# Patient Record
Sex: MALE | Race: BLACK OR AFRICAN AMERICAN | Employment: FULL TIME | URBAN - METROPOLITAN AREA
[De-identification: names, ages, dates, MRNs, and addresses within clinical notes are randomized per-mention and may not be internally consistent; named-entity substitution may affect disease eponyms.]

---

## 2017-05-01 ENCOUNTER — HOSPITAL ENCOUNTER (EMERGENCY)
Age: 42
Discharge: HOME | End: 2017-05-01
Attending: EMERGENCY MEDICINE
Payer: COMMERCIAL

## 2017-05-01 VITALS
TEMPERATURE: 98.1 F | HEIGHT: 64 IN | DIASTOLIC BLOOD PRESSURE: 107 MMHG | OXYGEN SATURATION: 98 % | WEIGHT: 260 LBS | RESPIRATION RATE: 18 BRPM | HEART RATE: 94 BPM | BODY MASS INDEX: 44.39 KG/M2 | SYSTOLIC BLOOD PRESSURE: 172 MMHG

## 2017-05-01 DIAGNOSIS — M77.8 TENDINITIS OF HAND: Primary | ICD-10-CM

## 2017-05-01 DIAGNOSIS — M79.641 HAND PAIN, RIGHT: ICD-10-CM

## 2017-05-01 DIAGNOSIS — Z87.39 PERSONAL HISTORY OF GOUT: ICD-10-CM

## 2017-05-01 PROCEDURE — 99282 EMERGENCY DEPT VISIT SF MDM: CPT

## 2017-05-01 RX ORDER — METHYLPREDNISOLONE 4 MG/1
TABLET ORAL
Qty: 1 DOSE PACK | Refills: 0 | Status: SHIPPED | OUTPATIENT
Start: 2017-05-01

## 2017-05-01 RX ORDER — HYDROCODONE BITARTRATE AND ACETAMINOPHEN 5; 325 MG/1; MG/1
1 TABLET ORAL
Qty: 20 TAB | Refills: 0 | Status: SHIPPED | OUTPATIENT
Start: 2017-05-01

## 2017-05-01 NOTE — DISCHARGE INSTRUCTIONS
Hand Pain: Care Instructions  Your Care Instructions  Common causes of hand pain are overuse and injuries, such as might happen during sports or home repair projects. Everyday wear and tear, especially as you get older, also can cause hand pain. Most minor hand injuries will heal on their own, and home treatment is usually all you need to do. If you have sudden and severe pain, you may need tests and treatment. Follow-up care is a key part of your treatment and safety. Be sure to make and go to all appointments, and call your doctor if you are having problems. Its also a good idea to know your test results and keep a list of the medicines you take. How can you care for yourself at home? · Take pain medicines exactly as directed. ¨ If the doctor gave you a prescription medicine for pain, take it as prescribed. ¨ If you are not taking a prescription pain medicine, ask your doctor if you can take an over-the-counter medicine. · Rest and protect your hand. Take a break from any activity that may cause pain. · Put ice or a cold pack on your hand for 10 to 20 minutes at a time. Put a thin cloth between the ice and your skin. · Prop up the sore hand on a pillow when you ice it or anytime you sit or lie down during the next 3 days. Try to keep it above the level of your heart. This will help reduce swelling. · If your doctor recommends a sling, splint, or elastic bandage to support your hand, wear it as directed. When should you call for help? Call 911 anytime you think you may need emergency care. For example, call if:  · Your hand turns cool or pale or changes color. Call your doctor now or seek immediate medical care if:  · You cannot move your hand. · Your hand pops, moves out of its normal position, and then returns to its normal position. · You have signs of infection, such as:  ¨ Increased pain, swelling, warmth, or redness. ¨ Red streaks leading from the sore area.   ¨ Pus draining from a place on your hand. ¨ A fever. · Your hand feels numb or tingly. Watch closely for changes in your health, and be sure to contact your doctor if:  · Your hand feels unstable when you try to use it. · You do not get better as expected. · You have any new symptoms, such as swelling. · Bruises from an injury to your hand last longer than 2 weeks. Where can you learn more? Go to http://rey-jennifer.info/. Enter R273 in the search box to learn more about \"Hand Pain: Care Instructions. \"  Current as of: May 27, 2016  Content Version: 11.2  © 0432-4320 [employer]. Care instructions adapted under license by [employer] (which disclaims liability or warranty for this information). If you have questions about a medical condition or this instruction, always ask your healthcare professional. [address] any warranty or liability for your use of this information.

## 2017-05-01 NOTE — ED TRIAGE NOTES
Pt states \" I am having hand pain for a month. I feel like sometimes it locks up. This morning i couldn't even go to work. \"

## 2017-05-01 NOTE — ED PROVIDER NOTES
[address] [address]  EMERGENCY DEPARTMENT HISTORY AND PHYSICAL EXAM       Date: 5/1/2017   Patient Name: Bita Gomez   YOB: 1975  Medical Record Number: [id number]    History of Presenting Illness     Chief Complaint   Patient presents with    Hand Pain       History Provided By:  patient    Additional History: 7:35 PM   Bita Gomez is a 36 y.o. male who presents to the emergency department with hx of gout (right ankle and toes) C/O right hand pain starting 2 weeks ago and worsening today. The pain is mostly in the center of the palm, and in the 4th finger. Sxs are worse in the morning. He has been seen by his PCP for his sxs and was instructed to take Aleve in the morning for his sxs. Pt reports that this has provided some relief for his pain, but was ineffective this morning. Pt is right-handed and works at the [employer] as a . Pt reports hx of tendonitis in the left hand 3 years ago and was prescribed physical therapy. Pt takes Allopurinol daily for gout. Denies numbness and any other sxs or complaints. Primary Care Provider: Albaro Pugh MD   Specialist:    Past History     Past Medical History:   Past Medical History:   Diagnosis Date    Hypertension         Past Surgical History:   Past Surgical History:   Procedure Laterality Date    ABDOMEN SURGERY PROC UNLISTED      hernia        Family History:   History reviewed. No pertinent family history. Social History:   Social History   Substance Use Topics    Smoking status: Never Smoker    Smokeless tobacco: None    Alcohol use Yes      Comment: socially        Allergies:   No Known Allergies     Review of Systems   Review of Systems   Musculoskeletal:        Right hand pain   Neurological: Negative for numbness. All other systems reviewed and are negative.       Physical Exam  Vitals:    05/01/17 1926   BP: (!) 172/107   Pulse: 94   Resp: 18   Temp: 98.1 °F (36.7 °C)   SpO2: 98%   Weight: 117.9 kg (260 lb)   Height: 5' 4\" (1.626 m)       Physical Exam   Constitutional: He is oriented to person, place, and time. Vital signs are normal. He appears well-developed and well-nourished. No distress. HENT:   Head: Normocephalic and atraumatic. Eyes: Conjunctivae are normal.   Neck: Normal range of motion. Neck supple. Cardiovascular: Normal rate, regular rhythm, normal heart sounds and intact distal pulses. Pulmonary/Chest: Effort normal and breath sounds normal. No respiratory distress. Musculoskeletal: Normal range of motion. He exhibits tenderness. He exhibits no edema or deformity. Right hand: He exhibits tenderness (+ttp). He exhibits normal range of motion (full ROM although pain increased when making fist/extending palm/fingers), no bony tenderness, normal capillary refill, no deformity, no laceration and no swelling. Normal sensation noted. Decreased sensation is not present in the ulnar distribution, is not present in the medial distribution and is not present in the radial distribution. Normal strength noted. He exhibits no finger abduction, no thumb/finger opposition and no wrist extension trouble. Hands:  Neurological: He is alert and oriented to person, place, and time. Skin: Skin is warm. He is not diaphoretic. Psychiatric: He has a normal mood and affect. Nursing note and vitals reviewed. Diagnostic Study Results     Labs -    No results found for this or any previous visit (from the past 12 hour(s)). Radiologic Studies -  The following have been ordered and reviewed:  No orders to display       Medical Decision Making   I am the first provider for this patient. I reviewed the vital signs, available nursing notes, past medical history, past surgical history, family history and social history. Vital Signs-Reviewed the patient's vital signs.    Patient Vitals for the past 12 hrs:   Temp Pulse Resp BP SpO2   05/01/17 1926 98.1 °F (36.7 °C) 94 18 (!) 172/107 98 %       Pulse Oximetry Analysis - Normal 98% on room air     Procedures:   Procedures    ED Course:  7:35 PM  Initial assessment performed. The patients presenting problems have been discussed, and they are in agreement with the care plan formulated and outlined with them. I have encouraged them to ask questions as they arise throughout their visit. Medications Given in the ED:  Medications - No data to display    Discharge Note:  7:42 PM   Pt has been reexamined. Patient has no new complaints, changes, or physical findings. Care plan outlined and precautions discussed. Results were reviewed with the patient. All medications were reviewed with the patient; will d/c home with Medrol Nahum and Norco. All of pt's questions and concerns were addressed. Patient was instructed and agrees to follow up with his PCP and Ortho, as well as to return to the ED upon further deterioration. Patient is ready to go home. Diagnosis   Clinical Impression:   1. Tendinitis of hand    2. Hand pain, right    3.  Personal history of gout         Follow-up Information     Follow up With Details Comments Contact Info    Ion Marks MD Schedule an appointment as soon as possible for a visit in 2 days For primary care follow up [address] [facility] and Internal MED   [facility] [phone]      Pedro Thorne MD Schedule an appointment as soon as possible for a visit in 2 days For orthopedic follow up [address]      THE [facility] EMERGENCY DEPT  As needed, If symptoms worsen [address] [facility]  [phone]          Discharge Medication List as of 5/1/2017  7:50 PM      START taking these medications    Details   methylPREDNISolone (MEDROL, NAHUM,) 4 mg tablet Take as directed, Print, Disp-1 Dose Pack, R-0      !! HYDROcodone-acetaminophen (NORCO) 5-325 mg per tablet Take 1 Tab by mouth every four (4) hours as needed for Pain. Max Daily Amount: 6 Tabs., Print, Disp-20 Tab, R-0       !! - Potential duplicate medications found. Please discuss with provider. CONTINUE these medications which have NOT CHANGED    Details   allopurinol (ZYLOPRIM) 300 mg tablet Take 300 mg by mouth daily. Indications: GOUT, Historical Med      amLODIPine (NORVASC) 10 mg tablet Take  by mouth daily. Indications: HYPERTENSION, Historical Med      !! HYDROcodone-acetaminophen (NORCO) 5-325 mg per tablet Take 1 tablet by mouth every four (4) hours as needed for Pain., Print, Disp-20 tablet, R-0       !! - Potential duplicate medications found. Please discuss with provider.          _______________________________   Attestations: This note is prepared by Arjun Geronimo, acting as a Scribe for Mallika Chaudhry PA-C on 7:34 PM on 5/1/2017 . Mallika Chaudhry PA-C : The scribe's documentation has been prepared under my direction and personally reviewed by me in its entirety.   _______________________________

## 2017-05-01 NOTE — ED NOTES
Pt comes in tonight with complaints of right hand pain. Pt states that he was unable to go to work today because \"my hand is locking up. \" Pt denies any injury, or trauma. Pt states he has a history of gout in the past but feels that his symptoms this time are different from those when he has gout flare ups.

## 2017-05-01 NOTE — LETTER
[facility] [place] 
[facility] 
[address] [id number] [phone] Work/School Note Date: 5/1/2017 To Whom It May concern: 
 
Skye Cisneros was seen and treated today in the emergency room by the following provider(s): 
Physician Assistant: Zaki Prado, [address]. Please excuse missed work. Skye Cisneros may return to work on 5/2/2017. Sincerely, Chapin Stallings PA-C

## 2017-05-02 NOTE — ED NOTES
Pt was discharged in good and improved condition. The patient's diagnosis, condition and treatment were explained to patient and aftercare instructions were given. The patient verbalized good understanding. Patient armband removed and both labels and armband were placed in shred bin. Patient left ER ambulatory with steady gait in no acute distress. 2 prescriptions provided. Patient reports pain is currently 5 on numeric scale. Patient provided education about pain medication including dosage and side effects. Patient verbalized understanding.

## 2020-08-24 ENCOUNTER — NEW PATIENT (OUTPATIENT)
Dept: URBAN - NONMETROPOLITAN AREA CLINIC 15 | Facility: CLINIC | Age: 45
End: 2020-08-24
Payer: COMMERCIAL

## 2020-08-24 DIAGNOSIS — Z79.84 LONG TERM (CURRENT) USE OF ORAL ANTIDIABETIC DRUGS: ICD-10-CM

## 2020-08-24 DIAGNOSIS — H25.813 COMBINED FORMS OF AGE-RELATED CATARACT, BILATERAL: ICD-10-CM

## 2020-08-24 DIAGNOSIS — E11.9 TYPE 2 DIABETES MELLITUS WITHOUT COMPLICATIONS: ICD-10-CM

## 2020-08-24 PROCEDURE — 92015 DETERMINE REFRACTIVE STATE: CPT | Performed by: OPTOMETRIST

## 2020-08-24 PROCEDURE — 92004 COMPRE OPH EXAM NEW PT 1/>: CPT | Performed by: OPTOMETRIST

## 2020-08-24 ASSESSMENT — VISUAL ACUITY
OS: 20/50
OD: 20/30

## 2020-08-24 ASSESSMENT — INTRAOCULAR PRESSURE
OS: 16
OD: 14

## 2020-09-11 ENCOUNTER — FOLLOW UP ESTABLISHED (OUTPATIENT)
Dept: URBAN - NONMETROPOLITAN AREA CLINIC 15 | Facility: CLINIC | Age: 45
End: 2020-09-11
Payer: MEDICARE

## 2020-09-11 DIAGNOSIS — H52.223 REGULAR ASTIGMATISM, BILATERAL: ICD-10-CM

## 2020-09-11 DIAGNOSIS — H53.9 UNSPECIFIED VISUAL DISTURBANCE: ICD-10-CM

## 2020-09-11 DIAGNOSIS — H10.423 SIMPLE CHRONIC CONJUNCTIVITIS, BILATERAL: Primary | ICD-10-CM

## 2020-09-11 PROCEDURE — 92012 INTRM OPH EXAM EST PATIENT: CPT | Performed by: OPTOMETRIST

## 2020-09-11 ASSESSMENT — VISUAL ACUITY
OS: 20/40
OD: 20/20

## 2020-09-11 ASSESSMENT — INTRAOCULAR PRESSURE
OD: 13
OS: 10

## 2020-11-10 ENCOUNTER — NEW PATIENT (OUTPATIENT)
Dept: URBAN - NONMETROPOLITAN AREA CLINIC 13 | Facility: CLINIC | Age: 45
End: 2020-11-10
Payer: MEDICARE

## 2020-11-10 DIAGNOSIS — H18.622 KERATOCONUS, UNSTABLE, LEFT EYE: Primary | ICD-10-CM

## 2020-11-10 PROCEDURE — 92025 CPTRIZED CORNEAL TOPOGRAPHY: CPT | Performed by: OPHTHALMOLOGY

## 2020-11-10 PROCEDURE — 99203 OFFICE O/P NEW LOW 30 MIN: CPT | Performed by: OPHTHALMOLOGY

## 2020-11-10 ASSESSMENT — VISUAL ACUITY
OD: 20/20
OS: 20/60

## 2020-11-25 ENCOUNTER — FOLLOW UP ESTABLISHED (OUTPATIENT)
Dept: URBAN - NONMETROPOLITAN AREA CLINIC 15 | Facility: CLINIC | Age: 45
End: 2020-11-25
Payer: MEDICARE

## 2020-11-25 PROCEDURE — 92310 CONTACT LENS FITTING OU: CPT | Performed by: OPTOMETRIST

## 2020-11-25 ASSESSMENT — INTRAOCULAR PRESSURE
OS: 12
OD: 13

## 2020-12-21 ENCOUNTER — FOLLOW UP ESTABLISHED (OUTPATIENT)
Dept: URBAN - NONMETROPOLITAN AREA CLINIC 15 | Facility: CLINIC | Age: 45
End: 2020-12-21

## 2020-12-21 PROCEDURE — 92310 CONTACT LENS FITTING OU: CPT | Performed by: OPTOMETRIST

## 2020-12-21 ASSESSMENT — INTRAOCULAR PRESSURE
OD: 17
OS: 17

## 2020-12-28 ENCOUNTER — FOLLOW UP ESTABLISHED (OUTPATIENT)
Dept: URBAN - NONMETROPOLITAN AREA CLINIC 15 | Facility: CLINIC | Age: 45
End: 2020-12-28

## 2020-12-28 PROCEDURE — 92310 CONTACT LENS FITTING OU: CPT | Performed by: OPTOMETRIST

## 2021-02-08 ENCOUNTER — FOLLOW UP ESTABLISHED (OUTPATIENT)
Dept: URBAN - NONMETROPOLITAN AREA CLINIC 15 | Facility: CLINIC | Age: 46
End: 2021-02-08

## 2021-02-08 DIAGNOSIS — H18.623 KERATOCONUS, UNSTABLE, BILATERAL: Primary | ICD-10-CM

## 2021-02-08 PROCEDURE — 92310 CONTACT LENS FITTING OU: CPT | Performed by: OPTOMETRIST

## 2022-07-22 ENCOUNTER — OFFICE VISIT (OUTPATIENT)
Dept: URBAN - NONMETROPOLITAN AREA CLINIC 14 | Facility: CLINIC | Age: 47
End: 2022-07-22
Payer: COMMERCIAL

## 2022-07-22 DIAGNOSIS — H52.223 REGULAR ASTIGMATISM, BILATERAL: Primary | ICD-10-CM

## 2022-07-22 PROCEDURE — 92014 COMPRE OPH EXAM EST PT 1/>: CPT | Performed by: OPTOMETRIST

## 2022-07-22 ASSESSMENT — INTRAOCULAR PRESSURE
OD: 10
OS: 9

## 2022-07-22 ASSESSMENT — VISUAL ACUITY
OS: 20/80
OD: 20/25

## 2023-12-19 ENCOUNTER — OFFICE VISIT (OUTPATIENT)
Dept: URBAN - NONMETROPOLITAN AREA CLINIC 14 | Facility: CLINIC | Age: 48
End: 2023-12-19
Payer: COMMERCIAL

## 2023-12-19 DIAGNOSIS — H18.623 KERATOCONUS, UNSTABLE, BILATERAL: ICD-10-CM

## 2023-12-19 DIAGNOSIS — H52.213 IRREGULAR ASTIGMATISM, BILATERAL: Primary | ICD-10-CM

## 2023-12-19 PROCEDURE — 92014 COMPRE OPH EXAM EST PT 1/>: CPT | Performed by: OPTOMETRIST

## 2024-01-19 ENCOUNTER — OFFICE VISIT (OUTPATIENT)
Dept: URBAN - NONMETROPOLITAN AREA CLINIC 14 | Facility: CLINIC | Age: 49
End: 2024-01-19

## 2024-01-19 DIAGNOSIS — H52.223 REGULAR ASTIGMATISM, BILATERAL: ICD-10-CM

## 2024-01-19 PROCEDURE — 92310 CONTACT LENS FITTING OU: CPT | Performed by: OPTOMETRIST

## 2024-01-19 ASSESSMENT — VISUAL ACUITY: OD: 20/20

## 2024-01-25 ENCOUNTER — OFFICE VISIT (OUTPATIENT)
Dept: URBAN - NONMETROPOLITAN AREA CLINIC 14 | Facility: CLINIC | Age: 49
End: 2024-01-25

## 2024-01-25 PROCEDURE — 92310 CONTACT LENS FITTING OU: CPT | Performed by: OPTOMETRIST

## 2024-01-25 ASSESSMENT — VISUAL ACUITY: OS: 20/25+2

## 2025-05-21 ENCOUNTER — OFFICE VISIT (OUTPATIENT)
Dept: URBAN - NONMETROPOLITAN AREA CLINIC 13 | Facility: CLINIC | Age: 50
End: 2025-05-21
Payer: COMMERCIAL

## 2025-05-21 DIAGNOSIS — H18.622 KERATOCONUS, UNSTABLE, LEFT EYE: ICD-10-CM

## 2025-05-21 DIAGNOSIS — H52.223 REGULAR ASTIGMATISM, BILATERAL: Primary | ICD-10-CM

## 2025-05-21 PROCEDURE — 92014 COMPRE OPH EXAM EST PT 1/>: CPT | Performed by: OPTOMETRIST

## 2025-05-21 PROCEDURE — 92310 CONTACT LENS FITTING OU: CPT | Performed by: OPTOMETRIST

## 2025-05-21 ASSESSMENT — INTRAOCULAR PRESSURE
OS: 13
OD: 14

## 2025-05-21 ASSESSMENT — VISUAL ACUITY
OS: 20/250
OD: 20/20